# Patient Record
Sex: MALE | Race: WHITE | Employment: OTHER | URBAN - METROPOLITAN AREA
[De-identification: names, ages, dates, MRNs, and addresses within clinical notes are randomized per-mention and may not be internally consistent; named-entity substitution may affect disease eponyms.]

---

## 2024-01-01 ENCOUNTER — OFFICE VISIT (OUTPATIENT)
Dept: OPHTHALMOLOGY | Facility: CLINIC | Age: 64
End: 2024-01-01
Payer: MEDICARE

## 2024-01-01 ENCOUNTER — HOSPITAL ENCOUNTER (EMERGENCY)
Facility: HOSPITAL | Age: 64
End: 2024-10-17
Attending: STUDENT IN AN ORGANIZED HEALTH CARE EDUCATION/TRAINING PROGRAM
Payer: MEDICARE

## 2024-01-01 DIAGNOSIS — H25.813 COMBINED FORMS OF AGE-RELATED CATARACT OF BOTH EYES: ICD-10-CM

## 2024-01-01 DIAGNOSIS — H57.03 MIOSIS NOT DUE TO MIOTICS: ICD-10-CM

## 2024-01-01 DIAGNOSIS — H52.7 REFRACTIVE ERROR: ICD-10-CM

## 2024-01-01 DIAGNOSIS — H40.003 GLAUCOMA SUSPECT OF BOTH EYES: ICD-10-CM

## 2024-01-01 DIAGNOSIS — I46.9 CARDIAC ARREST: Primary | ICD-10-CM

## 2024-01-01 DIAGNOSIS — R09.2 RESPIRATORY ARREST (MULTI): ICD-10-CM

## 2024-01-01 DIAGNOSIS — E11.9 TYPE 2 DIABETES MELLITUS WITHOUT COMPLICATION, WITHOUT LONG-TERM CURRENT USE OF INSULIN (MULTI): Primary | ICD-10-CM

## 2024-01-01 LAB — GLUCOSE BLD MANUAL STRIP-MCNC: 170 MG/DL (ref 74–99)

## 2024-01-01 PROCEDURE — 92950 HEART/LUNG RESUSCITATION CPR: CPT

## 2024-01-01 PROCEDURE — 2500000005 HC RX 250 GENERAL PHARMACY W/O HCPCS: Performed by: STUDENT IN AN ORGANIZED HEALTH CARE EDUCATION/TRAINING PROGRAM

## 2024-01-01 PROCEDURE — 31500 INSERT EMERGENCY AIRWAY: CPT

## 2024-01-01 PROCEDURE — 2500000004 HC RX 250 GENERAL PHARMACY W/ HCPCS (ALT 636 FOR OP/ED): Performed by: STUDENT IN AN ORGANIZED HEALTH CARE EDUCATION/TRAINING PROGRAM

## 2024-01-01 PROCEDURE — 99285 EMERGENCY DEPT VISIT HI MDM: CPT | Mod: 25 | Performed by: STUDENT IN AN ORGANIZED HEALTH CARE EDUCATION/TRAINING PROGRAM

## 2024-01-01 PROCEDURE — 94681 O2 UPTK CO2 OUTP % O2 XTRC: CPT

## 2024-01-01 PROCEDURE — 99214 OFFICE O/P EST MOD 30 MIN: CPT | Performed by: OPHTHALMOLOGY

## 2024-01-01 PROCEDURE — 92015 DETERMINE REFRACTIVE STATE: CPT | Performed by: OPHTHALMOLOGY

## 2024-01-01 PROCEDURE — 82947 ASSAY GLUCOSE BLOOD QUANT: CPT

## 2024-01-01 PROCEDURE — 31500 INSERT EMERGENCY AIRWAY: CPT | Performed by: PHYSICIAN ASSISTANT

## 2024-01-01 RX ORDER — EPINEPHRINE 0.1 MG/ML
INJECTION INTRACARDIAC; INTRAVENOUS CODE/TRAUMA/SEDATION MEDICATION
Status: COMPLETED | OUTPATIENT
Start: 2024-01-01 | End: 2024-01-01

## 2024-01-01 RX ORDER — DORZOLAMIDE HYDROCHLORIDE AND TIMOLOL MALEATE 20; 5 MG/ML; MG/ML
1 SOLUTION/ DROPS OPHTHALMIC 2 TIMES DAILY
Qty: 10 ML | Refills: 3 | Status: SHIPPED | OUTPATIENT
Start: 2024-01-01 | End: 2024-10-18

## 2024-01-01 RX ORDER — CALCIUM CHLORIDE INJECTION 100 MG/ML
INJECTION, SOLUTION INTRAVENOUS CODE/TRAUMA/SEDATION MEDICATION
Status: COMPLETED | OUTPATIENT
Start: 2024-01-01 | End: 2024-01-01

## 2024-01-01 RX ORDER — INDOMETHACIN 25 MG/1
CAPSULE ORAL CODE/TRAUMA/SEDATION MEDICATION
Status: COMPLETED | OUTPATIENT
Start: 2024-01-01 | End: 2024-01-01

## 2024-01-01 ASSESSMENT — VISUAL ACUITY
OD_CC+: -1
CORRECTION_TYPE: GLASSES
METHOD: SNELLEN - LINEAR
OS_CC: 20/20
OS_CC+: -1
OD_CC: 20/20

## 2024-01-01 ASSESSMENT — EXTERNAL EXAM - LEFT EYE: OS_EXAM: 1+ BROW PTOSIS

## 2024-01-01 ASSESSMENT — KERATOMETRY
OD_AXISANGLE_DEGREES: 115
OS_AXISANGLE2_DEGREES: 5
OS_K1POWER_DIOPTERS: 43.75
OS_AXISANGLE_DEGREES: 95
OD_K1POWER_DIOPTERS: 44.00
OD_K2POWER_DIOPTERS: 44.50
OD_AXISANGLE2_DEGREES: 25
OS_K2POWER_DIOPTERS: 44.50

## 2024-01-01 ASSESSMENT — REFRACTION_WEARINGRX
OD_SPHERE: -2.25
OS_SPHERE: -2.00
OS_ADD: +2.25
OD_ADD: +2.25
OS_AXIS: 020
OS_CYLINDER: -0.75

## 2024-01-01 ASSESSMENT — CUP TO DISC RATIO
OS_RATIO: 0.75
OD_RATIO: 0.5

## 2024-01-01 ASSESSMENT — REFRACTION_MANIFEST
OS_SPHERE: -2.00
OS_CYLINDER: -0.50
OD_SPHERE: -1.50
OD_AXIS: 005
METHOD_AUTOREFRACTION: 1
OD_CYLINDER: -0.25
OS_AXIS: 030

## 2024-01-01 ASSESSMENT — PATIENT HEALTH QUESTIONNAIRE - PHQ9
SUM OF ALL RESPONSES TO PHQ9 QUESTIONS 1 AND 2: 0
2. FEELING DOWN, DEPRESSED OR HOPELESS: NOT AT ALL
1. LITTLE INTEREST OR PLEASURE IN DOING THINGS: NOT AT ALL

## 2024-01-01 ASSESSMENT — ENCOUNTER SYMPTOMS
HEMATOLOGIC/LYMPHATIC NEGATIVE: 0
ALLERGIC/IMMUNOLOGIC NEGATIVE: 0
CONSTITUTIONAL NEGATIVE: 0
PSYCHIATRIC NEGATIVE: 0
NEUROLOGICAL NEGATIVE: 0
CARDIOVASCULAR NEGATIVE: 0
MUSCULOSKELETAL NEGATIVE: 0
EYES NEGATIVE: 0
ENDOCRINE NEGATIVE: 0
GASTROINTESTINAL NEGATIVE: 0
RESPIRATORY NEGATIVE: 0

## 2024-01-01 ASSESSMENT — TONOMETRY
OS_IOP_MMHG: 26
OD_IOP_MMHG: 23
IOP_METHOD: GOLDMANN APPLANATION

## 2024-01-01 ASSESSMENT — SLIT LAMP EXAM - LIDS
COMMENTS: 1+ DERMATOCHALASIS - UPPER LID, 1+ BLEPHARITIS
COMMENTS: 1+ DERMATOCHALASIS - UPPER LID, 1+ BLEPHARITIS

## 2024-01-01 ASSESSMENT — EXTERNAL EXAM - RIGHT EYE: OD_EXAM: 1+ BROW PTOSIS

## 2024-10-14 PROBLEM — H40.003 GLAUCOMA SUSPECT OF BOTH EYES: Status: ACTIVE | Noted: 2024-01-01

## 2024-10-14 PROBLEM — H57.03 MIOSIS NOT DUE TO MIOTICS: Status: ACTIVE | Noted: 2024-01-01

## 2024-10-14 PROBLEM — E11.9 TYPE 2 DIABETES MELLITUS WITHOUT COMPLICATION, WITHOUT LONG-TERM CURRENT USE OF INSULIN (MULTI): Status: ACTIVE | Noted: 2024-01-01

## 2024-10-14 PROBLEM — H52.7 REFRACTIVE ERROR: Status: ACTIVE | Noted: 2024-01-01

## 2024-10-14 PROBLEM — H25.813 COMBINED FORMS OF AGE-RELATED CATARACT OF BOTH EYES: Status: ACTIVE | Noted: 2024-01-01

## 2024-10-14 NOTE — ASSESSMENT & PLAN NOTE
OCT nerves today.  Intraocular pressure (IOP) high.  Nerves suspicious OS>OD.  F/u 1 mo intraocular pressure (IOP), pachy, and Aguilar visual field (HVF) 30-2.  Had been started on cosopt ou bid.  Will restart now.

## 2024-10-14 NOTE — PROGRESS NOTES
Subjective   Patient ID: Adrian Auguste is a 63 y.o. male.    Chief Complaint    New Patient Visit       HPI    UNKNOWN DOSAGES OF CURRENT MEDICATIONS. XYPREXA AND INVEGA PER prothrombin time (PT) ???????    Complete and diabetic dilated exam.  No recent changes in health history or meds.  Vision good and stable.  Diabetes mellitus (DM) recent.  Saw Jose Juárez at [facility] last year.  Looks like had been started on cosopt OU bid last year by Franck.  Also remote history of PI's .        Last edited by Earl Latham MD on 10/14/2024 10:38 AM.        No current outpatient medications on file. (Ophthalmology pharm classes)       No current outpatient medications on file. (Other)       Objective   Base Eye Exam       Visual Acuity (Snellen - Linear)         Right Left Both    Dist cc 20/20 -1 20/20 -1     Near cc   J1+      Correction: Glasses              Tonometry (Goldmann Applanation, 9:47 AM)         Right Left    Pressure 23 26   Holding lids and squeezing OU.               Pupils         Dark Shape React APD    Right 4 Round 1 None    Left 4 Round 1 None              Extraocular Movement         Right Left     Full Full              Dilation       Both eyes: 1% Tropic 2.5% Phen @ 9:47 AM                  Additional Tests       Keratometry         K1 Axis K2 Axis    Right 44.00 25 44.50 115    Left 43.75 5 44.50 95                  Slit Lamp and Fundus Exam       External Exam         Right Left    External 1+ Brow ptosis 1+ Brow ptosis              Slit Lamp Exam         Right Left    Lids/Lashes 1+ Dermatochalasis - upper lid, 1+ Blepharitis 1+ Dermatochalasis - upper lid, 1+ Blepharitis    Conjunctiva/Sclera White and quiet White and quiet    Cornea Clear Clear    Anterior Chamber Deep and quiet Deep and quiet    Iris Round and reactive Round and reactive    Lens 1+ Nuclear sclerosis 1+ Nuclear sclerosis    Anterior Vitreous Vitreous syneresis Vitreous syneresis              Fundus Exam         Right Left    Disc  deep cups deep cups, Thin rim    C/D Ratio 0.5 0.75    Macula Normal; no BDR Normal; no BDR    Vessels Normal Normal    Periphery Normal Normal                  Refraction       Wearing Rx         Sphere Cylinder Axis Add    Right -2.25   +2.25    Left -2.00 -0.75 020 +2.25              Manifest Refraction (Auto)         Sphere Cylinder Axis    Right -1.50 -0.25 005    Left -2.00 -0.50 030              Final Rx         Sphere Cylinder [place] Dist VA Add Near VA    Right -2.00 -0.25 080 20/20 +2.50 20/20    Left -1.75 -0.50 025 20/20 +2.50 20/20      Expiration Date: 10/14/2026    Pupillary Distance: 71                    Assessment/Plan   Problem List Items Addressed This Visit          Eye/Vision problems    Type 2 diabetes mellitus without complication, without long-term current use of insulin (Multi) - Primary     No signs of background diabetic retinopathy (BDR) OU.           Glaucoma suspect of both eyes     OCT nerves today.  Intraocular pressure (IOP) high.  Nerves suspicious OS>OD.  F/u 1 mo intraocular pressure (IOP), pachy, and Aguilar visual field (HVF) 30-2.  Had been started on cosopt ou bid.  Will restart now.           Relevant Orders    OCT, Optic Nerve - OU - Both Eyes    Combined forms of age-related cataract of both eyes    Refractive error

## 2024-10-14 NOTE — PROGRESS NOTES
Subjective   Patient ID: Adrian Auguste is a 63 y.o. male.    Chief Complaint    New Patient Visit       HPI    UNKNOWN DOSAGES OF CURRENT MEDICATIONS. XYPREXA AND INVEGA PER prothrombin time (PT) ???????    Complete and diabetic dilated exam.  No recent changes in health history or meds.  Vision good and stable.  Diabetes mellitus (DM) recent.  Saw Jose Juárez at [facility] last year.  Looks like had been started on cosopt OU bid last year by Franck.  Also remote history of PI's .        Last edited by Earl Latham MD on 10/14/2024 10:38 AM.        No current outpatient medications on file. (Ophthalmology pharm classes)       No current outpatient medications on file. (Other)       Objective   Base Eye Exam       Visual Acuity (Snellen - Linear)         Right Left Both    Dist cc 20/20 -1 20/20 -1     Near cc   J1+      Correction: Glasses              Tonometry (Goldmann Applanation, 9:47 AM)         Right Left    Pressure 23 26   Holding lids and squeezing OU.               Pupils         Dark Shape React APD    Right 4 Round 1 None    Left 4 Round 1 None              Extraocular Movement         Right Left     Full Full              Dilation       Both eyes: 1% Tropic 2.5% Phen @ 9:47 AM                  Additional Tests       Keratometry         K1 Axis K2 Axis    Right 44.00 25 44.50 115    Left 43.75 5 44.50 95                  Slit Lamp and Fundus Exam       External Exam         Right Left    External 1+ Brow ptosis 1+ Brow ptosis              Slit Lamp Exam         Right Left    Lids/Lashes 1+ Dermatochalasis - upper lid, 1+ Blepharitis 1+ Dermatochalasis - upper lid, 1+ Blepharitis    Conjunctiva/Sclera White and quiet White and quiet    Cornea Clear Clear    Anterior Chamber Deep and quiet Deep and quiet    Iris Round and reactive Round and reactive    Lens 1+ Nuclear sclerosis 1+ Nuclear sclerosis    Anterior Vitreous Vitreous syneresis Vitreous syneresis              Fundus Exam         Right Left    Disc  deep cups deep cups, Thin rim    C/D Ratio 0.5 0.75    Macula Normal; no BDR Normal; no BDR    Vessels Normal Normal    Periphery Normal Normal                  Refraction       Wearing Rx         Sphere Cylinder Axis Add    Right -2.25   +2.25    Left -2.00 -0.75 020 +2.25              Manifest Refraction (Auto)         Sphere Cylinder Axis    Right -1.50 -0.25 005    Left -2.00 -0.50 030              Final Rx         Sphere Cylinder [place] Dist VA Add Near VA    Right -2.00 -0.25 080 20/20 +2.50 20/20    Left -1.75 -0.50 025 20/20 +2.50 20/20      Expiration Date: 10/14/2026    Pupillary Distance: 71                    Assessment/Plan   Problem List Items Addressed This Visit          Eye/Vision problems    Type 2 diabetes mellitus without complication, without long-term current use of insulin (Multi) - Primary     No signs of background diabetic retinopathy (BDR) OU.           Glaucoma suspect of both eyes     OCT nerves today.  Intraocular pressure (IOP) high.  Nerves suspicious OS>OD.  F/u 1 mo intraocular pressure (IOP), pachy, and Aguilar visual field (HVF) 30-2.  Will probably need to start med.           Relevant Orders    OCT, Optic Nerve - OU - Both Eyes    Combined forms of age-related cataract of both eyes    Refractive error

## 2024-10-14 NOTE — LETTER
October 14, 2024    Keila Mora, APRN-CNP  [id number] [address]     Patient: Adrian Auguste   YOB: 1960   Date of Visit: 10/14/2024       Dear Dr. Keila Mora, APRN-CNP:    Thank you for referring Adrian Auguste to me for evaluation. Here is my assessment and plan of care:    Assessment/Plan:  Adrian was seen today for new patient visit.  Diagnoses and all orders for this visit:  Type 2 diabetes mellitus without complication, without long-term current use of insulin (Multi) (Primary)  Glaucoma suspect of both eyes  -     OCT, Optic Nerve - OU - Both Eyes  -     dorzolamide-timoloL (Cosopt) 22.3-6.8 mg/mL ophthalmic solution; Administer 1 drop into both eyes 2 times a day.  Combined forms of age-related cataract of both eyes  Refractive error    Below you will find my full exam findings. If you have questions, please do not hesitate to call me. I look forward to following Adrian along with you.     No signs background diabetic retinopathy (BDR) OU.  Glaucoma suspect OU.  Will restart drops (gtts) and f/u in one month.      Sincerely,        Earl Latham MD        CC:   No Recipients        Base Eye Exam       Visual Acuity (Snellen - Linear)         Right Left Both    Dist cc 20/20 -1 20/20 -1     Near cc   J1+      Correction: Glasses              Tonometry (Goldmann Applanation, 9:47 AM)         Right Left    Pressure 23 26   Holding lids and squeezing OU.               Pupils         Dark Shape React APD    Right 4 Round 1 None    Left 4 Round 1 None              Extraocular Movement         Right Left     Full Full              Dilation       Both eyes: 1% Tropic 2.5% Phen @ 9:47 AM                  Additional Tests       Keratometry         K1 Axis K2 Axis    Right 44.00 25 44.50 115    Left 43.75 5 44.50 95                  Slit Lamp and Fundus Exam       External Exam         Right Left    External 1+ Brow ptosis 1+ Brow ptosis              Slit  Lamp Exam         Right Left    Lids/Lashes 1+ Dermatochalasis - upper lid, 1+ Blepharitis 1+ Dermatochalasis - upper lid, 1+ Blepharitis    Conjunctiva/Sclera White and quiet White and quiet    Cornea Clear Clear    Anterior Chamber Deep and quiet Deep and quiet    Iris Round and reactive Round and reactive    Lens 1+ Nuclear sclerosis 1+ Nuclear sclerosis    Anterior Vitreous Vitreous syneresis Vitreous syneresis              Fundus Exam         Right Left    Disc deep cups deep cups, Thin rim    C/D Ratio 0.5 0.75    Macula Normal; no BDR Normal; no BDR    Vessels Normal Normal    Periphery Normal Normal                  Refraction       Wearing Rx         Sphere Cylinder Axis Add    Right -2.25   +2.25    Left -2.00 -0.75 020 +2.25              Manifest Refraction (Auto)         Sphere Cylinder Axis    Right -1.50 -0.25 005    Left -2.00 -0.50 030              Final Rx         Sphere Cylinder [place] Dist VA Add Near VA    Right -2.00 -0.25 080 20/20 +2.50 20/20    Left -1.75 -0.50 025 20/20 +2.50 20/20      Expiration Date: 10/14/2026    Pupillary Distance: 71

## 2024-10-14 NOTE — PROGRESS NOTES
Subjective   Patient ID: Adrain Auguste is a 63 y.o. male.    Chief Complaint    New Patient Visit       HPI    UNKNOWN DOSAGES OF CURRENT MEDICATIONS. XYPREXA AND INVEGA PER prothrombin time (PT) ???????    Complete and diabetic dilated exam.  No recent changes in health history or meds.  Vision good and stable.  Diabetes mellitus (DM) recent.  Saw Jose Juárez at [facility] last year.        Last edited by Earl Lathma MD on 10/14/2024 10:30 AM.        No current outpatient medications on file. (Ophthalmology pharm classes)       No current outpatient medications on file. (Other)       Objective   Base Eye Exam       Visual Acuity (Snellen - Linear)         Right Left Both    Dist cc 20/20 -1 20/20 -1     Near cc   J1+      Correction: Glasses              Tonometry (Goldmann Applanation, 9:47 AM)         Right Left    Pressure 23 26   Holding lids and squeezing OU.               Pupils         Dark Shape React APD    Right 4 Round 1 None    Left 4 Round 1 None              Extraocular Movement         Right Left     Full Full              Dilation       Both eyes: 1% Tropic 2.5% Phen @ 9:47 AM                  Additional Tests       Keratometry         K1 Axis K2 Axis    Right 44.00 25 44.50 115    Left 43.75 5 44.50 95                  Slit Lamp and Fundus Exam       External Exam         Right Left    External 1+ Brow ptosis 1+ Brow ptosis              Slit Lamp Exam         Right Left    Lids/Lashes 1+ Dermatochalasis - upper lid, 1+ Blepharitis 1+ Dermatochalasis - upper lid, 1+ Blepharitis    Conjunctiva/Sclera White and quiet White and quiet    Cornea Clear Clear    Anterior Chamber Deep and quiet Deep and quiet    Iris Round and reactive Round and reactive    Lens 1+ Nuclear sclerosis 1+ Nuclear sclerosis    Anterior Vitreous Vitreous syneresis Vitreous syneresis              Fundus Exam         Right Left    Disc deep cups deep cups, Thin rim    C/D Ratio 0.5 0.75    Macula Normal; no BDR Normal; no BDR     Vessels Normal Normal    Periphery Normal Normal                  Refraction       Wearing Rx         Sphere Cylinder Axis Add    Right -2.25   +2.25    Left -2.00 -0.75 020 +2.25              Manifest Refraction (Auto)         Sphere Cylinder Axis    Right -1.50 -0.25 005    Left -2.00 -0.50 030              Final Rx         Sphere Cylinder [place] Dist VA Add Near VA    Right -2.00 -0.25 080 20/20 +2.50 20/20    Left -1.75 -0.50 025 20/20 +2.50 20/20      Expiration Date: 10/14/2026    Pupillary Distance: 71                    Assessment/Plan   Problem List Items Addressed This Visit          Eye/Vision problems    Type 2 diabetes mellitus without complication, without long-term current use of insulin (Multi) - Primary     No signs of background diabetic retinopathy (BDR) OU.           Glaucoma suspect of both eyes     OCT nerves today.  Intraocular pressure (IOP) high.  Nerves suspicious OS>OD.  F/u 1 mo intraocular pressure (IOP), pachy, and Aguilar visual field (HVF) 30-2.  Will probably need to start med.           Combined forms of age-related cataract of both eyes    Refractive error

## 2024-10-17 NOTE — ED PROVIDER NOTES
HPI   No chief complaint on file.      HPI  63-year-old male brought in by EMS for cardiac arrest.  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  While at the [facility] he collapsed, some bystanders called 911 initiated CPR, EMS arrived, continued CPR and ACLS guidelines, the patient reportedly was asystole on pulse checks for EMS.  Arrived to the hospital unresponsive and active arrest.  The patient had several rounds of ACLS guidelines, no sign of injury or trauma reported.  History of diabetes psychiatric condition and alcohol use      Patient History   No past medical history on file.  No past surgical history on file.  No family history on file.  Social History     Tobacco Use    Smoking status: Every Day     Types: Cigarettes    Smokeless tobacco: Never   Substance Use Topics    Alcohol use: Not Currently    Drug use: Not Currently       Physical Exam   ED Triage Vitals   Temp Pulse Resp BP   -- -- -- --      SpO2 Temp src Heart Rate Source Patient Position   -- -- -- --      BP Location FiO2 (%)     -- --       Physical Exam  PHYSICAL EXAMINATION    GENERAL APPEARANCE: Unconscious, critical condition    VITAL SIGNS: As per the nurses' triage record.     HEENT: Normocephalic, atraumatic.  Fixed and dilated pupils.      NECK: No obvious sign of injury or trauma    CHEST: Chaparro device providing chest compressions.  Otherwise no sign of obvious injury or trauma    HEART: Femoral pulses appreciated when Chaparro machine is providing compressions.    ABDOMEN: Soft, nontender, sign of obvious trauma    MUSCULOSKELETAL: No obvious sign of injury or trauma with some lower extremity edema slightly more so on the left however left IO is present.     NEUROLOGICAL: Not awake, not alert, unable to assess neurologic status as patient is unconscious    IMMUNOLOGICAL: No lymphatic streaking noted     DERM: No petechiae, rashes, or ecchymoses.    ED Course & MDM                  No  data recorded                                 Medical Decision Making  Parts of this chart have been completed using voice recognition software. Please excuse any errors of transcription.  My thought process and reason for plan has been formulated from the time that I saw the patient until the time of disposition and is not specific to one specific moment during their visit and furthermore my MDM encompasses this entire chart and not only this text box.      HPI: Detailed above.    Exam: A medically appropriate exam performed, outlined above, given the known history and presentation.    History Limited by: Patient's critical condition    History obtained from: EMS report    External/internal records reviewed: Reviewed the patient's record and most of his visits were related to diabetes or anxiety    Social Determinants of Health considered during this visit: Reported that patient drinks daily and [place] the [employer] daily    Chronic conditions impacting care: Diabetes, anxiety, alcohol use    Medications given during visit:  Medications - No data to display     Diagnostic/tests  Labs Reviewed   POCT GLUCOSE - Abnormal       Result Value    POCT Glucose 170 (*)       No orders to display        EKG: Unable to obtain secondary to ACLS guidelines.  Patient remained asystole with a possible run of ventricular rhythm for which  he was shocked during code.    Diagnostic tests considered but not performed: Considered number of test including labs and diagnostics and imaging however patient did not have return of spontaneous circulation and was not stable for additional testing    Case discussed with: Attending physician who was at patient's bedside        Considerations/further MDM:  The patient active cardiac and pulmonary arrest.  BLS and ACLS guidelines followed.  Please refer to code sheet for further details of the code.  The definitive airway was obtained.  The patient did not have return of spontaneous  circulation with greater than 30 minutes of downtime.  Considered dehydration electrolyte disturbances, cardiac dysrhythmia, ACS, pulmonary embolism, diabetic complication of which his sugar was in the upper 100s, the patient unfortunately did not have response to the ACLS guidelines be performed.  After several additional rounds after arrival to the hospital and greater than 30 minutes of downtime patient was pronounced  by attending physician.    Please refer to code sheet for further details    Please refer to attending physician document for further details of patient's care    Upon my evaluation, this patient had a high probability of imminent or life-threatening deterioration which required my direct attention, intervention, and personal management  I personally saw the patient and independently provided 21 minutes of non-concurrent critical care time were provided which excludes all other billable procedures.  This was for management of critical abnormalities within vital signs with evidence of multiorgan system failure with evidence to be suggestive of respiratory and pulmonary arrest requiring consultation to respiratory therapy, consultation to EMS quarterback, continuous bedside monitoring, continuous monitoring and initiation of life-saving guidelines        Procedure  Intubation    Performed by: Pradip Amador PA-C  Authorized by: Virgilio Paz DO    Consent:     Consent obtained:  Emergent situation  Universal protocol:     Patient identity confirmed:  Anonymous protocol, patient vented/unresponsive  Pre-procedure details:     Indications: airway protection, altered consciousness, cardio/pulmonary arrest, respiratory distress and respiratory failure      Patient status:  Unresponsive    Look externally: no concerns      Neck mobility: normal      Induction agents:  None    Paralytics:  None  Procedure details:     Preoxygenation:  Bag valve mask    CPR in progress: yes      Number  of attempts:  1  Successful intubation attempt details:     Intubation method:  Oral    Intubation technique: video assisted      Tube size (mm):  8.0    Tube type:  Cuffed    Tube visualized through cords: yes    Placement assessment:     ETT at teeth/gumline (cm):  26    Tube secured with:  ETT terrazas    Breath sounds:  Equal    Placement verification: chest rise, colorimetric ETCO2, direct visualization and waveform ETCO2    Post-procedure details:     Procedure completion:  Tolerated  Comments:      Active cardiac arrest, active pulmonary arrest, chest compressions in progress, oral airway was in place, removed and definitive airway secured by me with ET tube.  Successful on first attempt with no complications.       Pradip Amador PA-C  10/17/24 0937

## 2024-10-19 NOTE — ED PROVIDER NOTES
I personally saw the patient alongside the physician assistant and made/approved the management plan and take responsibility for the patient management.    This is a 63-year-old male who was brought to the emergency department in cardiac arrest.  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  He also is a known smoker.  On arrival by EMS no other medical history as noted by the patient.  The report is that he went through the doors at [employer], collapsed onto the floor and became unresponsive.  EMS was called and CPR was initiated.  Patient's rhythm has been asystole since the time of their arrival.  ACLS protocol was followed and is ongoing upon arrival the patient remains unresponsive in asystole.    Physical Exam  General: well developed, well nourished adult, presents in cardiac arrest.  Patient is unresponsive.  Compressions ongoing upon arrival.  Eyes: Pupils dilated and nonreactive  HENT: normocephalic, atraumatic.  LMA in place upon arrival.  CV: Patient without palpable pulse, no cardiac activity.  Initial rhythm is asystole on the monitor  Resp: Patient receiving breaths via BVM  GI: abdomen soft, nondistended, no masses palpated.  No evidence of abdominal trauma.  MSK: No evidence of trauma to the extremities.  No obvious deformity or injury.  Neuro: Patient unresponsive to verbal and painful stimuli.  Skin: warm, dry, without evidence of rash or abrasions    MDM:  Patient brought to the emergency department by EMS in cardiac arrest.  ACLS care started at 9:40 AM, patient collapsed prior to this time.  Total downtime on arrival to the ED is greater than 20 minutes.  Initial rhythm is asystole upon arrival.  Compressions ongoing.  The patient was intubated for definitive airway, was given calcium and sodium bicarbonate to stabilize the cardiac membrane in case of hyperkalemia as well as to treat ongoing acidosis which could contribute to  persistent arrhythmia.  He did convert into what appeared to be V-fib at 1 point and was cardioverted, he did have runs of both asystole and PEA intermittently.  ACLS care was continued, after 30 to 40 minutes of total downtime from when he collapsed I did perform a bedside cardiac ultrasound which shows no cardiac motion whatsoever.  No right heart strain.  I do not suspect massive PE was the cause of his arrest.  Given that he has received numerous doses of epinephrine as well as ongoing ACLS care and no identifiable reversible cause of his cardiac arrest was found, no improvement in his condition was achieved, I feel there is a high likelihood that the patient has developed severe irreversible anoxic brain injury and will not regain meaningful function even if we are able to achieve ROSC which at this time has not been obtained despite her best efforts and management per ACLS protocol.  Patient was pronounced, time of death 914.    TOD: 0914    Attempted to reach family and the patient's caregiver listed in the chart who seems to be a staff member at [facility] and is not in the office until next week.  I was not able to reach any family member for him to discuss today's events.    Labs Reviewed   POCT GLUCOSE - Abnormal       Result Value    POCT Glucose 170 (*)      Diagnoses as of 10/18/24 2211   Cardiac arrest   Respiratory arrest (Multi)           Critical Care Time: 10 minutes excluding time spent performing procedures, treating other patients, and teaching time.    Critical Concern/Vital Organ System Affected: Cardiac arrest    Critical Intervention: ACLS protocol, ordering and administering medications, frequent reassessment of patient condition     Virgilio Paz, DO  10/18/24 2211

## 2024-11-15 ENCOUNTER — APPOINTMENT (OUTPATIENT)
Dept: OPHTHALMOLOGY | Facility: CLINIC | Age: 64
End: 2024-11-15
Payer: MEDICARE